# Patient Record
Sex: FEMALE | Race: WHITE | HISPANIC OR LATINO | Employment: FULL TIME | URBAN - METROPOLITAN AREA
[De-identification: names, ages, dates, MRNs, and addresses within clinical notes are randomized per-mention and may not be internally consistent; named-entity substitution may affect disease eponyms.]

---

## 2024-02-23 ENCOUNTER — HOSPITAL ENCOUNTER (EMERGENCY)
Facility: CLINIC | Age: 54
Discharge: HOME | End: 2024-02-23
Attending: EMERGENCY MEDICINE | Admitting: EMERGENCY MEDICINE

## 2024-02-23 VITALS
HEART RATE: 74 BPM | OXYGEN SATURATION: 99 % | TEMPERATURE: 98.3 F | DIASTOLIC BLOOD PRESSURE: 85 MMHG | SYSTOLIC BLOOD PRESSURE: 142 MMHG | RESPIRATION RATE: 16 BRPM

## 2024-02-23 DIAGNOSIS — J02.9 ACUTE PHARYNGITIS, UNSPECIFIED ETIOLOGY: ICD-10-CM

## 2024-02-23 LAB — GROUP A STREP BY PCR: NOT DETECTED

## 2024-02-23 PROCEDURE — 99283 EMERGENCY DEPT VISIT LOW MDM: CPT

## 2024-02-23 PROCEDURE — 87651 STREP A DNA AMP PROBE: CPT | Performed by: EMERGENCY MEDICINE

## 2024-02-23 PROCEDURE — 250N000013 HC RX MED GY IP 250 OP 250 PS 637: Performed by: EMERGENCY MEDICINE

## 2024-02-23 RX ADMIN — Medication 10 MG: at 04:16

## 2024-02-23 ASSESSMENT — COLUMBIA-SUICIDE SEVERITY RATING SCALE - C-SSRS
1. IN THE PAST MONTH, HAVE YOU WISHED YOU WERE DEAD OR WISHED YOU COULD GO TO SLEEP AND NOT WAKE UP?: NO
6. HAVE YOU EVER DONE ANYTHING, STARTED TO DO ANYTHING, OR PREPARED TO DO ANYTHING TO END YOUR LIFE?: NO
2. HAVE YOU ACTUALLY HAD ANY THOUGHTS OF KILLING YOURSELF IN THE PAST MONTH?: NO

## 2024-02-23 ASSESSMENT — ACTIVITIES OF DAILY LIVING (ADL): ADLS_ACUITY_SCORE: 33

## 2024-02-23 NOTE — ED TRIAGE NOTES
Here for concern of sore throat started about 1 week ago associated with losing her voice, coughing, and nasal congestion. Took tylenol last night. ABCs intact.      Triage Assessment (Adult)       Row Name 02/23/24 0332          Triage Assessment    Airway WDL WDL        Respiratory WDL    Respiratory WDL WDL        Cardiac WDL    Cardiac WDL WDL

## 2024-02-23 NOTE — ED PROVIDER NOTES
History     Chief Complaint:  Pharyngitis (/)     History obtained by myself in [place]ian.  HPI   Aleena Guadalupe is a 53 year old female who presents with sore throat for the past 3 to 4 days. This is accompanied by cough, nasal congestion, and voice change.  She denies shortness of breath, fever, or chills.  No known ill contacts.  Cough is dry.  She is able to swallow liquids without difficulty.  She took tylenol tonight.       Independent Historian:   None - Patient Only    Review of External Notes:   None      Medications:    The patient is not currently taking any prescribed medications.     Past Medical History:    The patient denies any significant past medical history.     Physical Exam   Patient Vitals for the past 24 hrs:   BP Temp Temp src Pulse Resp SpO2   02/23/24 0331 (!) 142/85 98.3  F (36.8  C) Temporal 74 16 99 %        Physical Exam  Gen:  Pleasant, appears stated age.    Eye:   Sclera non-injected.      ENT:  Moist mucus membranes.  Normal tongue.  Oropharynx without lesions, Not erythematous.  No exudate or tonsillar hypertrophy.  Normal uvula, in midline.  No anterior cervical MC.  Tracheal cartilage non-tender.  Full flexion and extension of neck.  Voice slightly hoarse.  No stridor.  No drooling.    Musculoskeletal:     Normal movement of all extremities without evidence for deficit.    Extremities:    No edema.  Atraumatic.      Skin:   Warm and dry.  No rash.    Neurologic:    Non-focal exam without asymmetric weakness or numbness.    Fluent speech.    Psychiatric:     Normal affect with appropriate interaction with examiner.      Emergency Department Course   Laboratory:  Labs Ordered and Resulted from Time of ED Arrival to Time of ED Departure   GROUP A STREPTOCOCCUS PCR THROAT SWAB - Normal       Result Value    Group A strep by PCR Not Detected          Emergency Department Course & Assessments:  Interventions:  Medications   dexAMETHasone (DECADRON) alcohol-free oral solution  10 mg (10 mg Oral $Given 2/23/24 0419)        Assessments:  0356 I obtained history and examined the patient as noted above.   0425 I rechecked the patient and explained findings.     Independent Interpretation (X-rays, CTs, rhythm strip):  None    Consultations/Discussion of Management or Tests:  None        Social Determinants of Health affecting care:   None    Disposition:  The patient was discharged.     Impression & Plan    Medical Decision Making:  Aleena Guadalupe is a 53 year old female, otherwise healthy, who presents with sore throat and hoarse voice.  On exam, symptoms are consistent with laryngitis.  I do not suspect retropharyngeal abscess, peritonsillar abscess, epiglottitis, tracheitis, Satish angina, or other dangerous condition.  The patient is breathing and swallowing normally.  Exam is negative for any of the above.  Strep testing is negative.  She was given a dose of Decadron in the ED.  I recommended continued supportive care at home, Tylenol or ibuprofen as needed.  Return to the ED for worsening symptoms, including difficulty breathing, throat tightness, pain with swallowing, fever, or further concerns.  Discharge instructions provided in [place]ian.      Diagnosis:    ICD-10-CM    1. Acute pharyngitis, unspecified etiology  J02.9              Scribe Disclosure:  IPatel, am serving as a scribe at 3:56 AM on 2/23/2024 to document services personally performed by Dacia Payne MD based on my observations and the provider's statements to me.   2/23/2024   Dacia Payne MD Pepper, Tracy Lynn, MD  02/23/24 1002

## 2024-02-23 NOTE — Clinical Note
Aleena Guadalupe was seen and treated in our emergency department on 2/23/2024.  She may return to work on 02/26/2024.       If you have any questions or concerns, please don't hesitate to call.      Dacia Payne MD